# Patient Record
Sex: MALE | Race: WHITE | Employment: FULL TIME | URBAN - METROPOLITAN AREA
[De-identification: names, ages, dates, MRNs, and addresses within clinical notes are randomized per-mention and may not be internally consistent; named-entity substitution may affect disease eponyms.]

---

## 2021-03-24 ENCOUNTER — HOSPITAL ENCOUNTER (OUTPATIENT)
Age: 26
Discharge: HOME | End: 2021-03-24
Payer: COMMERCIAL

## 2021-03-24 ENCOUNTER — APPOINTMENT (OUTPATIENT)
Dept: GENERAL RADIOLOGY | Age: 26
End: 2021-03-24
Attending: NURSE PRACTITIONER
Payer: COMMERCIAL

## 2021-03-24 VITALS
OXYGEN SATURATION: 98 % | DIASTOLIC BLOOD PRESSURE: 85 MMHG | HEART RATE: 75 BPM | TEMPERATURE: 98 F | SYSTOLIC BLOOD PRESSURE: 148 MMHG | RESPIRATION RATE: 16 BRPM

## 2021-03-24 DIAGNOSIS — M25.569 KNEE PAIN: Primary | ICD-10-CM

## 2021-03-24 PROCEDURE — 99203 OFFICE O/P NEW LOW 30 MIN: CPT | Performed by: NURSE PRACTITIONER

## 2021-03-24 PROCEDURE — 73560 X-RAY EXAM OF KNEE 1 OR 2: CPT | Performed by: NURSE PRACTITIONER

## 2021-03-24 NOTE — ED INITIAL ASSESSMENT (HPI)
Pt presents to the IC with c/o atraumatic left knee pain, intermittently for the last year, worse in the last year. Pt notes pain behind the patella. No swelling or deformity. No hx of previous injury.  Pt has been using a compression dressing and elevating

## 2021-03-24 NOTE — ED PROVIDER NOTES
Patient Seen in: [facility]    History   Patient presents with:  Knee Pain    Stated Complaint: left knee pain x  x5    HPI    HPI: Mckay Whitten is a 30year old male who presents with pain to the L knee that started over the past 1 year.   Ov Labs Reviewed - No data to display  Medical Record Review/reassessment: I reviewed available prior medical records for any recent pertinent discharge summaries/testing.  Patient/family updated on results and plan, a verbalized understanding and agreement